# Patient Record
Sex: FEMALE | Race: WHITE | NOT HISPANIC OR LATINO | Employment: OTHER | URBAN - METROPOLITAN AREA
[De-identification: names, ages, dates, MRNs, and addresses within clinical notes are randomized per-mention and may not be internally consistent; named-entity substitution may affect disease eponyms.]

---

## 2022-03-28 DIAGNOSIS — K62.5 BRIGHT RED BLOOD PER RECTUM: Primary | ICD-10-CM

## 2023-07-10 DIAGNOSIS — Z12.11 COLON CANCER SCREENING: Primary | ICD-10-CM

## 2023-07-10 DIAGNOSIS — Z80.0 FAMILY HISTORY OF COLON CANCER: ICD-10-CM

## 2023-08-04 ENCOUNTER — TELEPHONE (OUTPATIENT)
Dept: GASTROENTEROLOGY | Facility: CLINIC | Age: 43
End: 2023-08-04
Payer: COMMERCIAL

## 2023-08-09 ENCOUNTER — TELEPHONE (OUTPATIENT)
Dept: GASTROENTEROLOGY | Facility: CLINIC | Age: 43
End: 2023-08-09
Payer: COMMERCIAL

## 2023-08-09 NOTE — TELEPHONE ENCOUNTER
----- Message from Adán Mallory MA sent at 8/8/2023  4:21 PM CDT -----  Contact: pt  Does this pt need a office visit     ----- Message -----  From: Carole Huff  Sent: 8/8/2023   2:20 PM CDT  To: [facility] Staff; #    Pt calling to schedule a colonoscopy and can be reached at [phone].    Thanks,

## 2023-08-31 RX ORDER — DEXTROAMPHETAMINE SACCHARATE, AMPHETAMINE ASPARTATE MONOHYDRATE, DEXTROAMPHETAMINE SULFATE AND AMPHETAMINE SULFATE 6.25; 6.25; 6.25; 6.25 MG/1; MG/1; MG/1; MG/1
CAPSULE, EXTENDED RELEASE ORAL
COMMUNITY
Start: 2023-04-20 | End: 2023-09-01

## 2023-08-31 RX ORDER — AMLODIPINE BESYLATE 10 MG/1
TABLET ORAL
COMMUNITY
Start: 2023-03-24 | End: 2023-09-01

## 2023-08-31 RX ORDER — INDAPAMIDE 2.5 MG/1
TABLET ORAL
COMMUNITY
Start: 2023-04-20 | End: 2023-09-01

## 2023-08-31 RX ORDER — FLUOXETINE HYDROCHLORIDE 20 MG/1
CAPSULE ORAL
COMMUNITY
Start: 2023-04-04 | End: 2023-09-01

## 2023-08-31 RX ORDER — PROGESTERONE 100 MG/1
CAPSULE ORAL
COMMUNITY
Start: 2023-07-26 | End: 2023-12-13

## 2023-08-31 RX ORDER — LEVOTHYROXINE, LIOTHYRONINE 38; 9 UG/1; UG/1
TABLET ORAL
COMMUNITY
Start: 2023-07-26

## 2023-08-31 NOTE — PROGRESS NOTES
Clinic Note    Reason for visit:  The primary encounter diagnosis was Family history of colon cancer in father. A diagnosis of Colon cancer screening was also pertinent to this visit.    PCP: Steven Juarez    [address] [facility] / [facility] [place]    HPI:  This is a 43 y.o. female here to be established. Father with CRC dx in 60s. Patient denies any reflux, dysphagia, abdominal pain, constipation, diarrhea, or blood in stool.     Colonsocopy in 2016 with Dr. Lobato was normal. She had this done due to abdominal pain. Was found to have possible gallstone. She controls with diet and no longer has abdominal pain .    Review of Systems   Constitutional:  Positive for unexpected weight change. Negative for fatigue and fever.   HENT:  Negative for mouth sores, postnasal drip, sore throat and trouble swallowing.    Eyes:  Negative for pain, discharge and eye dryness.   Respiratory:  Negative for apnea, cough, choking, chest tightness, shortness of breath and wheezing.    Cardiovascular:  Negative for chest pain, palpitations and leg swelling.   Gastrointestinal:  Negative for abdominal distention, abdominal pain, anal bleeding, blood in stool, change in bowel habit, constipation, diarrhea, nausea, rectal pain, vomiting, reflux, fecal incontinence and change in bowel habit.   Genitourinary:  Positive for bladder incontinence. Negative for dysuria and hematuria.   Musculoskeletal:  Negative for arthralgias, back pain and joint swelling.   Integumentary:  Negative for color change and rash.   Allergic/Immunologic: Negative for environmental allergies and food allergies.   Neurological:  Negative for seizures and headaches.   Hematological:  Negative for adenopathy. Does not bruise/bleed easily.        Past Medical History:   Diagnosis Date    BMI 30.0-30.9,adult     HTN (hypertension)     Prediabetes     Thyroid disorder      Past Surgical History:   Procedure Laterality Date      "SECTION      x7    COLONOSCOPY  2017    HYSTERECTOMY       Family History   Problem Relation Age of Onset    Colon cancer Father 60 - 69    Crohn's disease Neg Hx     Esophageal cancer Neg Hx     Liver disease Neg Hx     Rectal cancer Neg Hx     Stomach cancer Neg Hx     Ulcerative colitis Neg Hx     Pancreatic cancer Neg Hx      Social History     Tobacco Use    Smoking status: Never    Smokeless tobacco: Never   Substance Use Topics    Alcohol use: Yes     Alcohol/week: 1.0 standard drink of alcohol     Types: 1 Glasses of wine per week     Comment: once a month    Drug use: Never     Review of patient's allergies indicates:  No Known Allergies   Medication List with Changes/Refills   New Medications    SOD SULF-POT CHLORIDE-MAG SULF (SUTAB) 1.479-0.188- 0.225 GRAM TABLET    Take 12 tablets by mouth once daily. Take according to package instructions with indicated amount of water. No breakfast day before test. May substitute with Suprep, Clenpiq, Plenvu, Moviprep or GoLytely based on Rx plan and patient preference.   Current Medications    NP THYROID 60 MG TAB        PROGESTERONE (PROMETRIUM) 100 MG CAPSULE        SEMAGLUTIDE SUBQ    Inject into the skin.   Discontinued Medications    AMLODIPINE (NORVASC) 10 MG TABLET        DEXTROAMPHETAMINE-AMPHETAMINE (ADDERALL XR) 25 MG 24 HR CAPSULE        FLUOXETINE 20 MG CAPSULE        INDAPAMIDE (LOZOL) 2.5 MG TAB             Vital Signs:  BP (!) 137/99 (BP Location: Left arm, Patient Position: Sitting, BP Method: Medium (Automatic))   Pulse 84   Ht 5' 5" (1.651 m)   Wt 82.1 kg (181 lb)   SpO2 98%   BMI 30.12 kg/m²        Physical Exam  Vitals reviewed.   Constitutional:       General: She is awake. She is not in acute distress.     Appearance: Normal appearance. She is well-developed. She is not ill-appearing, toxic-appearing or diaphoretic.   HENT:      Head: Normocephalic and atraumatic.      Nose: Nose normal.      Mouth/Throat:      Mouth: Mucous membranes are " moist.      Pharynx: Oropharynx is clear. No oropharyngeal exudate or posterior oropharyngeal erythema.   Eyes:      General: Lids are normal. Gaze aligned appropriately. No scleral icterus.        Right eye: No discharge.         Left eye: No discharge.      Conjunctiva/sclera: Conjunctivae normal.   Neck:      Trachea: Trachea normal.   Cardiovascular:      Rate and Rhythm: Normal rate and regular rhythm.      Pulses:           Radial pulses are 2+ on the right side and 2+ on the left side.   Pulmonary:      Effort: Pulmonary effort is normal. No respiratory distress.      Breath sounds: No stridor. No wheezing.   Chest:      Chest wall: No tenderness.   Abdominal:      General: Bowel sounds are normal. There is no distension.      Palpations: Abdomen is soft. There is no fluid wave, hepatomegaly or mass.      Tenderness: There is no abdominal tenderness. There is no guarding or rebound.   Musculoskeletal:         General: No tenderness or deformity.      Cervical back: Full passive range of motion without pain and neck supple. No tenderness.      Right lower leg: No edema.      Left lower leg: No edema.   Lymphadenopathy:      Cervical: No cervical adenopathy.   Skin:     General: Skin is warm and dry.      Capillary Refill: Capillary refill takes less than 2 seconds.      Coloration: Skin is not cyanotic, jaundiced or pale.   Neurological:      General: No focal deficit present.      Mental Status: She is alert and oriented to person, place, and time.      Motor: No tremor.   Psychiatric:         Attention and Perception: Attention normal.         Mood and Affect: Mood and affect normal.         Speech: Speech normal.         Behavior: Behavior normal. Behavior is cooperative.            All of the data above and below has been reviewed by myself and any further interpretations will be reflected in the assessment and plan.   The data includes review of external notes, and independent interpretation of lab  results, procedures, x-rays, and imaging reports.      Assessment:  Family history of colon cancer in father  -     Ambulatory referral/consult to Gastroenterology  -     Ambulatory Referral to External Surgery    Colon cancer screening  -     Ambulatory referral/consult to Gastroenterology  -     Ambulatory Referral to External Surgery  -     sod sulf-pot chloride-mag sulf (SUTAB) 1.479-0.188- 0.225 gram tablet; Take 12 tablets by mouth once daily. Take according to package instructions with indicated amount of water. No breakfast day before test. May substitute with Suprep, Clenpiq, Plenvu, Moviprep or GoLytely based on Rx plan and patient preference.  Dispense: 24 tablet; Refill: 0    F/H CRC- father. Due for colonoscopy     Recommendations:    Schedule colonoscopy with Dr. Galaviz.    Risks, benefits, and alternatives of medical management, any associated procedures, and/or treatment discussed with the patient. Patient given opportunity to ask questions and voices understanding. Patient has elected to proceed with the recommended care modalities as discussed.    Follow up if symptoms worsen or fail to improve.    Order summary:  Orders Placed This Encounter   Procedures    Ambulatory Referral to External Surgery        Instructed patient to notify my office if they have not been contacted within two weeks after any procedures, submitting any samples (biopsies, blood, stool, urine, etc.) or after any imaging (X-ray, CT, MRI, etc.).      Tammie Mcdaniels NP    This document may have been created using a voice recognition transcribing system. Incorrect words or phrases may have been missed during proofreading. Please interpret accordingly or contact me for clarification.

## 2023-09-01 ENCOUNTER — OFFICE VISIT (OUTPATIENT)
Dept: GASTROENTEROLOGY | Facility: CLINIC | Age: 43
End: 2023-09-01
Payer: COMMERCIAL

## 2023-09-01 VITALS
SYSTOLIC BLOOD PRESSURE: 137 MMHG | HEIGHT: 65 IN | WEIGHT: 181 LBS | BODY MASS INDEX: 30.16 KG/M2 | DIASTOLIC BLOOD PRESSURE: 99 MMHG | OXYGEN SATURATION: 98 % | HEART RATE: 84 BPM

## 2023-09-01 DIAGNOSIS — Z80.0 FAMILY HISTORY OF COLON CANCER IN FATHER: Primary | ICD-10-CM

## 2023-09-01 DIAGNOSIS — Z12.11 COLON CANCER SCREENING: ICD-10-CM

## 2023-09-01 PROCEDURE — 99499 NO LOS: ICD-10-PCS | Mod: S$GLB,,, | Performed by: NURSE PRACTITIONER

## 2023-09-01 PROCEDURE — 99499 UNLISTED E&M SERVICE: CPT | Mod: S$GLB,,, | Performed by: NURSE PRACTITIONER

## 2023-09-01 RX ORDER — SOD SULF/POT CHLORIDE/MAG SULF 1.479 G
12 TABLET ORAL DAILY
Qty: 24 TABLET | Refills: 0 | Status: SHIPPED | OUTPATIENT
Start: 2023-09-01

## 2023-09-01 NOTE — PATIENT INSTRUCTIONS
Schedule colonoscopy with Dr. Galaviz.    Please notify my office if you have not been contacted within two weeks after any procedures, submitting any samples (biopsies, blood, stool, urine, etc.) or after any imaging (X-ray, CT, MRI, etc.).

## 2023-09-21 ENCOUNTER — TELEPHONE (OUTPATIENT)
Dept: GASTROENTEROLOGY | Facility: CLINIC | Age: 43
End: 2023-09-21
Payer: COMMERCIAL

## 2023-11-03 DIAGNOSIS — Z80.0 FAMILY HISTORY OF COLON CANCER IN FATHER: Primary | ICD-10-CM

## 2023-11-03 DIAGNOSIS — Z12.11 COLON CANCER SCREENING: ICD-10-CM

## 2023-11-03 NOTE — PROGRESS NOTES
"[facility]  [address] [place] [address]  Phone: [phone]  Fax: [phone]    History & Physical         Provider: Dr. Shannon Galaviz    Patient Name: Renetta GORE (age):1980  43 y.o.           Gender: female   Phone: [phone]     Referring Physician: Steven Juarez     Vital Signs:   Height - 5'5"  Weight - 181 lb  BMI -  30.1    Plan: Colonoscopy    Encounter Diagnoses   Name Primary?    Family history of colon cancer in father Yes    Colon cancer screening            History:      Past Medical History:   Diagnosis Date    BMI 30.0-30.9,adult     HTN (hypertension)     Prediabetes     Thyroid disorder       Past Surgical History:   Procedure Laterality Date     SECTION      x7    COLONOSCOPY  2017    HYSTERECTOMY        Medication List with Changes/Refills   Current Medications    NP THYROID 60 MG TAB        PROGESTERONE (PROMETRIUM) 100 MG CAPSULE        SEMAGLUTIDE SUBQ    Inject into the skin.    SOD SULF-POT CHLORIDE-MAG SULF (SUTAB) 1.479-0.188- 0.225 GRAM TABLET    Take 12 tablets by mouth once daily. Take according to package instructions with indicated amount of water. No breakfast day before test. May substitute with Suprep, Clenpiq, Plenvu, Moviprep or GoLytely based on Rx plan and patient preference.      Review of patient's allergies indicates:  No Known Allergies   Family History   Problem Relation Age of Onset    Colon cancer Father 60 - 69    Crohn's disease Neg Hx     Esophageal cancer Neg Hx     Liver disease Neg Hx     Rectal cancer Neg Hx     Stomach cancer Neg Hx     Ulcerative colitis Neg Hx     Pancreatic cancer Neg Hx       Social History     Tobacco Use    Smoking status: Never    Smokeless tobacco: Never   Substance Use Topics    Alcohol use: Yes     Alcohol/week: 1.0 standard drink of alcohol     Types: 1 Glasses of wine per week     " Comment: once a month    Drug use: Never        Physical Examination:     General Appearance:___________________________  HEENT: _____________________________________  Abdomen:____________________________________  Heart:________________________________________  Lungs:_______________________________________  Extremities:___________________________________  Skin:_________________________________________  Endocrine:____________________________________  Genitourinary:_________________________________  Neurological:__________________________________      Patient has been evaluated immediately prior to sedation and is medically cleared for endoscopy with IVCS as an ASA class: ______      Physician Signature: _________________________       Date: ________  Time: ________

## 2023-12-13 ENCOUNTER — TELEPHONE (OUTPATIENT)
Dept: GASTROENTEROLOGY | Facility: CLINIC | Age: 43
End: 2023-12-13
Payer: COMMERCIAL

## 2023-12-13 VITALS — HEIGHT: 64 IN | WEIGHT: 170 LBS | BODY MASS INDEX: 29.02 KG/M2

## 2023-12-13 DIAGNOSIS — Z80.0 FAMILY HISTORY OF COLON CANCER IN FATHER: ICD-10-CM

## 2023-12-13 DIAGNOSIS — Z12.11 COLON CANCER SCREENING: Primary | ICD-10-CM

## 2023-12-13 NOTE — TELEPHONE ENCOUNTER
----- Message from Gladis Mcqueen MA sent at 12/12/2023  3:28 PM CST -----  Contact: Pt    ----- Message -----  From: Favio Snowden  Sent: 12/12/2023   2:48 PM CST  To: [facility] Staff    Pt is calling to follow up on being scheduled for her colonoscopy and can be reached at  [phone]/thanks/dbw

## 2023-12-13 NOTE — TELEPHONE ENCOUNTER
Patient no show'd the last scheduled colonoscopy w/ NBP(11/6/23). Patient denied having any current problems or issues. She also denied having any hx of seizures, sleep apnea, or kidney disease. Chart was reviewed and updated with patient. Prep instructions were also reviewed and sent to patient's email at [email or web address].    Fam hx of colon cancer.  Colonoscopy scheduled w/ NBP on Wednesday May 1, 2024 at [facility]. - [facility]

## 2023-12-20 RX ORDER — SOD SULF/POT CHLORIDE/MAG SULF 1.479 G
12 TABLET ORAL DAILY
Qty: 24 TABLET | Refills: 0 | Status: SHIPPED | OUTPATIENT
Start: 2023-12-20

## 2024-04-23 ENCOUNTER — TELEPHONE (OUTPATIENT)
Dept: GASTROENTEROLOGY | Facility: CLINIC | Age: 44
End: 2024-04-23
Payer: COMMERCIAL

## 2024-04-23 VITALS — BODY MASS INDEX: 29.02 KG/M2 | HEIGHT: 64 IN | WEIGHT: 170 LBS

## 2024-04-23 DIAGNOSIS — Z80.0 FAMILY HISTORY OF COLON CANCER: ICD-10-CM

## 2024-04-23 DIAGNOSIS — Z12.11 COLON CANCER SCREENING: Primary | ICD-10-CM

## 2024-04-23 NOTE — TELEPHONE ENCOUNTER
Called and left a detailed message that I was calling as a courtesy regarding upcoming COLON with NBP on 5/1/24, Wed and wanted to verify that she had her paper prep instructions and meds. I also mentioned that [facility] will call the day before (TUES) with the arrival time, GI Lab is located on the third floor, and to pre-register next week. gerhard

## 2024-04-23 NOTE — TELEPHONE ENCOUNTER
"[facility]  [address] [place] [address]  Phone: [phone]  Fax: [phone]    History & Physical         Provider: Dr. Shannon Galaviz    Patient Name: Renetta GORE (age):1980  43 y.o.           Gender: female   Phone: [phone]     Referring Physician: Steven Juarez     Vital Signs:   Height - 5' 4"  Weight - 170 lb  BMI -  29.18    Plan: Colonoscopy @ [facility]    Encounter Diagnoses   Name Primary?    Colon cancer screening Yes    Family history of colon cancer            History:      Past Medical History:   Diagnosis Date    BMI 30.0-30.9,adult     HTN (hypertension)     Prediabetes     Thyroid disorder       Past Surgical History:   Procedure Laterality Date     SECTION      x7    COLONOSCOPY  2017    HYSTERECTOMY  2019      Medication List with Changes/Refills   Current Medications    NP THYROID 60 MG TAB        SOD SULF-POT CHLORIDE-MAG SULF (SUTAB) 1.479-0.188- 0.225 GRAM TABLET    Take 12 tablets by mouth once daily. Take according to package instructions with indicated amount of water. No breakfast day before test. May substitute with Suprep, Clenpiq, Plenvu, Moviprep or GoLytely based on Rx plan and patient preference.    SOD SULF-POT CHLORIDE-MAG SULF (SUTAB) 1.479-0.188- 0.225 GRAM TABLET    Take 12 tablets by mouth once daily. Take according to package instructions with indicated amount of water. No breakfast day before test. May substitute with Suprep, Clenpiq, Plenvu, Moviprep or GoLytely based on Rx plan and patient preference.      Review of patient's allergies indicates:  No Known Allergies   Family History   Problem Relation Name Age of Onset    Colon cancer Father  60 - 69    Crohn's disease Neg Hx      Esophageal cancer Neg Hx      Liver disease Neg Hx      Rectal cancer Neg Hx      Stomach cancer Neg Hx      Ulcerative colitis Neg Hx      " Pancreatic cancer Neg Hx        Social History     Tobacco Use    Smoking status: Never    Smokeless tobacco: Never   Substance Use Topics    Alcohol use: Yes     Alcohol/week: 1.0 standard drink of alcohol     Types: 1 Glasses of wine per week     Comment: once a month    Drug use: Never        Physical Examination:     General Appearance:___________________________  HEENT: _____________________________________  Abdomen:____________________________________  Heart:________________________________________  Lungs:_______________________________________  Extremities:___________________________________  Skin:_________________________________________  Endocrine:____________________________________  Genitourinary:_________________________________  Neurological:__________________________________      Patient has been evaluated immediately prior to sedation and is medically cleared for endoscopy with IVCS as an ASA class: ______      Physician Signature: _________________________       Date: ________  Time: ________

## 2024-04-24 ENCOUNTER — TELEPHONE (OUTPATIENT)
Dept: GASTROENTEROLOGY | Facility: CLINIC | Age: 44
End: 2024-04-24
Payer: COMMERCIAL

## 2024-04-24 NOTE — TELEPHONE ENCOUNTER
----- Message from Margarita Arredondo sent at 4/24/2024  3:54 PM CDT -----  Patient is calling to rescheduled 5/1/24 procedure for colonoscopy please call her back at [phone]

## 2024-04-24 NOTE — TELEPHONE ENCOUNTER
Returned pt call and left v/m that I received her message and to please call back and someone would be happy to reschedule her colonoscopy that is on 5/1/24. gerhard

## 2024-04-29 ENCOUNTER — TELEPHONE (OUTPATIENT)
Dept: GASTROENTEROLOGY | Facility: CLINIC | Age: 44
End: 2024-04-29
Payer: COMMERCIAL

## 2024-04-29 NOTE — TELEPHONE ENCOUNTER
----- Message from Gldais Mcqueen MA sent at 4/29/2024  7:54 AM CDT -----  Regarding: r/s  Contact: PT    ----- Message -----  From: Roxanne Potts  Sent: 4/26/2024   2:02 PM CDT  To: [facility] Staff    Type:  Patient Returning Call    Who Called:N/A  Who Left Message for Patient:Daphnevince  Does the patient know what this is regarding?:RESCHEDULING [facility]  Would the patient rather a call back or a response via [facility]?   Best Call Back Number:[phone]   Additional Information: N/A

## 2024-04-30 NOTE — TELEPHONE ENCOUNTER
From: Carole Huff   Sent: 4/29/2024   3:01 PM CDT   To: Gu[facility] STO[facility] Staff     Pt  returning a missed call about rescheduling her procedure and she can be reached at [phone]       4/30/24 8:00 AM    Returned pt call. Pt is unable to keep procedure on 5/1/24, due to find a ride. R/s pt to 9/3/24 -Tues. Pt asked to be put on the cancellation list. Pt mentioned that summer time is better for her because her daughter is home from college. gerhard

## 2024-08-26 ENCOUNTER — TELEPHONE (OUTPATIENT)
Dept: GASTROENTEROLOGY | Facility: CLINIC | Age: 44
End: 2024-08-26
Payer: COMMERCIAL

## 2024-08-26 VITALS — BODY MASS INDEX: 29.02 KG/M2 | WEIGHT: 170 LBS | HEIGHT: 64 IN

## 2024-08-26 DIAGNOSIS — Z12.11 COLON CANCER SCREENING: Primary | ICD-10-CM

## 2024-08-26 DIAGNOSIS — Z80.0 FAMILY HISTORY OF COLON CANCER IN FATHER: ICD-10-CM

## 2024-08-26 NOTE — TELEPHONE ENCOUNTER
Called and left a detailed message that I was calling as a courtesy regarding up coming EGD/Colon with NBP on 9/3/24, Tuesday and wanted to verify that she has her paper prep instructions and meds. I also mentioned that [facility] will call the day before (MON) with the arrival time, GI Lab is located on the third floor, and to pre-register any time this week. gerhard

## 2024-08-26 NOTE — TELEPHONE ENCOUNTER
"[facility]  [address] [place] [address]  Phone: [phone]  Fax: [phone]    History & Physical         Provider: Dr. Shannon Galaviz    Patient Name: Renetta GORE (age):1980  44 y.o.           Gender: female   Phone: [phone]     Referring Physician: Steven Juarez     Vital Signs:   Height - 5' 4"  Weight - 170 lb  BMI -  29.18    Plan: Colonoscopy @ [facility]    Encounter Diagnoses   Name Primary?    Colon cancer screening Yes    Family history of colon cancer in father            History:      Past Medical History:   Diagnosis Date    BMI 30.0-30.9,adult     HTN (hypertension)     Prediabetes     Thyroid disorder       Past Surgical History:   Procedure Laterality Date     SECTION      x7    COLONOSCOPY  2017    HYSTERECTOMY  2019      Medication List with Changes/Refills   Current Medications    NP THYROID 60 MG TAB        SOD SULF-POT CHLORIDE-MAG SULF (SUTAB) 1.479-0.188- 0.225 GRAM TABLET    Take 12 tablets by mouth once daily. Take according to package instructions with indicated amount of water. No breakfast day before test. May substitute with Suprep, Clenpiq, Plenvu, Moviprep or GoLytely based on Rx plan and patient preference.    SOD SULF-POT CHLORIDE-MAG SULF (SUTAB) 1.479-0.188- 0.225 GRAM TABLET    Take 12 tablets by mouth once daily. Take according to package instructions with indicated amount of water. No breakfast day before test. May substitute with Suprep, Clenpiq, Plenvu, Moviprep or GoLytely based on Rx plan and patient preference.      Review of patient's allergies indicates:  No Known Allergies   Family History   Problem Relation Name Age of Onset    Colon cancer Father  60 - 69    Crohn's disease Neg Hx      Esophageal cancer Neg Hx      Liver disease Neg Hx      Rectal cancer Neg Hx      Stomach cancer Neg Hx      Ulcerative colitis Neg Hx   "    Pancreatic cancer Neg Hx        Social History     Tobacco Use    Smoking status: Never    Smokeless tobacco: Never   Substance Use Topics    Alcohol use: Yes     Alcohol/week: 1.0 standard drink of alcohol     Types: 1 Glasses of wine per week     Comment: once a month    Drug use: Never        Physical Examination:     General Appearance:___________________________  HEENT: _____________________________________  Abdomen:____________________________________  Heart:________________________________________  Lungs:_______________________________________  Extremities:___________________________________  Skin:_________________________________________  Endocrine:____________________________________  Genitourinary:_________________________________  Neurological:__________________________________      Patient has been evaluated immediately prior to sedation and is medically cleared for endoscopy with IVCS as an ASA class: ______      Physician Signature: _________________________       Date: ________  Time: ________

## 2024-08-30 NOTE — TELEPHONE ENCOUNTER
Rec'd message from Rh[facility]- GI Lab that pt needs to cancel procedure for 9/3/24 due to family illness and would like to r/s. I called pt and left v/m to call me back and I would be happy to r/s her procedure. gerhard

## 2024-11-19 ENCOUNTER — TELEPHONE (OUTPATIENT)
Dept: GASTROENTEROLOGY | Facility: CLINIC | Age: 44
End: 2024-11-19
Payer: COMMERCIAL

## 2024-11-19 NOTE — TELEPHONE ENCOUNTER
Working of the cancellation list and tried call pt to see if she would be avail for 11/21/24 - Thurs to have colonoscopy. I rec'd v/m which I didn't leave a message. gerhard